# Patient Record
Sex: MALE | Race: WHITE
[De-identification: names, ages, dates, MRNs, and addresses within clinical notes are randomized per-mention and may not be internally consistent; named-entity substitution may affect disease eponyms.]

---

## 2019-12-18 ENCOUNTER — HOSPITAL ENCOUNTER (INPATIENT)
Dept: HOSPITAL 74 - YASAS | Age: 59
LOS: 4 days | Discharge: HOME | End: 2019-12-22
Attending: ALLERGY & IMMUNOLOGY | Admitting: ALLERGY & IMMUNOLOGY
Payer: COMMERCIAL

## 2019-12-18 VITALS — BODY MASS INDEX: 20.2 KG/M2

## 2019-12-18 DIAGNOSIS — F32.9: ICD-10-CM

## 2019-12-18 DIAGNOSIS — F12.20: ICD-10-CM

## 2019-12-18 DIAGNOSIS — Z86.11: ICD-10-CM

## 2019-12-18 DIAGNOSIS — F10.230: Primary | ICD-10-CM

## 2019-12-18 DIAGNOSIS — F17.213: ICD-10-CM

## 2019-12-18 DIAGNOSIS — J45.20: ICD-10-CM

## 2019-12-18 PROCEDURE — HZ2ZZZZ DETOXIFICATION SERVICES FOR SUBSTANCE ABUSE TREATMENT: ICD-10-PCS | Performed by: ALLERGY & IMMUNOLOGY

## 2019-12-18 RX ADMIN — Medication PRN MG: at 22:13

## 2019-12-18 RX ADMIN — METHOCARBAMOL PRN MG: 500 TABLET ORAL at 22:12

## 2019-12-18 RX ADMIN — ALBUTEROL SULFATE PRN PUFF: 90 AEROSOL, METERED RESPIRATORY (INHALATION) at 22:13

## 2019-12-18 RX ADMIN — Medication SCH MG: at 22:12

## 2019-12-18 NOTE — HP
CIWA Score


Nausea/Vomitin-No Nausea/No Vomiting


Muscle Tremors: None


Anxiety: 4-Mod. Anxious/Guarded


Agitation: 4-Moderately Restless


Paroxysmal Sweats: 3


Orientation: 1-Uncertain about Date


Tacttile Disturbances: 0-None


Auditory Disturbances: 0-None


Visual Disturbances: 0-None


Headache: 0-None Present


CIWA-Ar Total Score: 12





- Admission Criteria


[facility] Guidelines: Admission for Medically Managed Detox: 


Requires at least one of the followin. CIWA greater than 12


2. Seizures within the past 24 hours


3. Delirium tremens within the past 24 hours


4. Hallucinations within the past 24 hours


5. Acute intervention needed for co  occurring medical disorder


6. Acute intervention needed for co  occurring psychiatric disorder


7. Severe withdrawal that cannot be handled at a lower level of care (continued


    vomiting, continued diarrhea, abnormal vital signs) requiring intravenous


    medication and/or fluids


8. Pregnancy





Patient presents the following: CIWA greater than 12


Admission Criteria Met: Admission criteria met





Admission ROS [facility]





- [facility]


Chief Complaint: 





SEEKING ALCOHOL DETOX


Allergies/Adverse Reactions: 


 Allergies











Allergy/AdvReac Type Severity Reaction Status Date / Time


 


No Known Allergies Allergy   Verified 19 17:02











History of Present Illness: 





HERE FOR ALCOHOL DETOX. CLIENT IS REFERRED BY [facility] WHERE HE IS ON 

METHADONE 70 MG DAILY. CLIENT REPORTS LAST DOSE 1 DAY AGO PENDING VERIFICATION. 

REPORTS DAILY ALCOHOL INTAKE. LAST INTAKE THIS MORNING, + [employer] [employer]ENER, . DENIES 

IVDA, DRUG OVERDOSE , SEIZURES, BLACKOUTS. CLIENT CONT TO USE 4 BAGS OF HEROIN 

DAILY DESPITE BEING ON MMTP. THIS IS HIS FIRST ADMISSION HERE. LAST DETOX "YEARS

". LONGEST CLEAN TIME 7 YEARS RELAPSING OVER 20 YEARS AGO. DENIES ANY CLEAN 

TIME THIS PAST YEAR. LIVES W/ WIFE, UNEMPLOYED, DENIES LEGALS


Exam Limitations: No Limitations





- Ebola screening


Have you traveled outside of the country in the last 21 days: No (N)


Have you had contact with anyone from an Ebola affected area: No


Do you have a fever: No





- Review of Systems


Constitutional: Changes in sleep, Unintentional Wgt. Loss


EENT: reports: Blurred Vision (GLASSES), Dental Problems (MISSING TEETH), Other


Respiratory: reports: Shortness of Breath (HX/O ASTHMA)


Cardiac: reports: No Symptoms Reported


GI: reports: Poor Fluid Intake


: reports: No Symptoms Reported


Musculoskeletal: reports: No Symptoms Reported


Integumentary: reports: No Symptoms Reported


Neuro: reports: No Symptoms reported


Endocrine: reports: No Symptoms Reported


Hematology: reports: No Symptoms Reported


Psychiatric: reports: Orientated x3, Anxious


Other Systems: Reviewed and Negative





Patient History





- Patient Medical History


Hx Anemia: No


Hx Asthma: Yes


Hx Chronic Obstructive Pulmonary Disease (COPD): No


Hx Cancer: No


Hx Cardiac Disorders: No


Hx Congestive Heart Failure: No


Hx Hypertension: No


Hx Hypercholesterolemia: No


Hx Pacemaker: No


HX Cerebrovascular Accident: No


Hx Seizures: No


Hx Dementia: No


Hx Diabetes: No


Hx Gastrointestinal Disorders: No


Hx Liver Disease: No


Hx Genitourinary Disorders: No


Hx Sexually Transmitted Disorders: No


Hx Renal Disease (ESRD): No


Hx Thyroid Disease: No


Hx Human Immunodeficiency Virus (HIV): No


Hx Hepatitis C: No


Hx Depression: No


Hx Suicide Attempt: No


Hx Bipolar Disorder: No


Hx Schizophrenia: No


Other Medical History: DENIES





- Patient Surgical History


Past Surgical History: No





- PPD History


Previous Implant?: Yes


Documented Results: Positive w/o proof


Implanted On Prior [facility] Admission?: No


PPD to be Administered?: No





- Smoking Cessation


Smoking history: Current every day smoker


Have you smoked in the past 12 months: Yes


Aproximately how many cigarettes per day: 4


Cigars Per Day: 0


Hx Chewing Tobacco Use: No


Initiated information on smoking cessation: Yes


'Breaking Loose' booklet given: 19





- Substance & Tx. History


Hx Alcohol Use: Yes


Hx Substance Use: Yes


Substance Use Type: Alcohol, Heroin, Prescribed (MTD)


Hx Substance Use Treatment: Yes (DOES NOT RECALL)





- Substances abused


  ** Alcohol


Substance route: Oral


Frequency: Daily


Amount used: 15 CANS OF 24 OZ


Age of first use: 18


Date of last use: 19 (5 CANS)





  ** Heroin


Substance route: Inhalation


Frequency: Daily


Amount used: 4 bags


Age of first use: 19


Date of last use: 19





Admission Physical Exam [facility]





- Vital Signs


Vital Signs: 


 Vital Signs - 24 hr











  19





  17:01 17:34


 


Temperature 97.6 F 97.6 F


 


Pulse Rate 73 73


 


Respiratory 16 16





Rate  


 


Blood Pressure 130/85 130/85














- Physical


General Appearance: Yes: Mild Distress, Anxious


HEENTM: Yes: EOMI, Normocephalic, Normal Voice, SHERRY, Pharynx Normal, Other (

missing teeth)


Respiratory: Yes: Chest Non-Tender, Lungs Clear, Normal Breath Sounds, No 

Respiratory Distress, No Accessory Muscle Use


Neck: Yes: No masses,lesions,Nodules, Supple, Trachea in good position


Breast: Yes: Breast Exam Deferred


Cardiology: Yes: Regular Rhythm, Regular Rate, S1, S2


Abdominal: Yes: Non Tender, Soft, Increased Bowel Sounds


Genitourinary: Yes: Within Normal Limits


Back: Yes: Normal Inspection


Musculoskeletal: Yes: full range of Motion, Gait Steady


Extremities: Yes: Normal Capillary Refill, Normal Range of Motion, Non-Tender


Neurological: Yes: Fully Oriented, Alert, Motor Strength 5/5, Depressed Affect (

denies si)


Integumentary: Yes: Dry, Warm


Lymphatic: Yes: Within Normal Limits





- Diagnostic


(1) Alcohol dependence with withdrawal, uncomplicated


Current Visit: Yes   Status: Acute   





(2) Opiate abuse, episodic


Current Visit: Yes   Status: Acute   





(3) Methadone maintenance therapy patient


Current Visit: Yes   Status: Chronic   





(4) Asthma


Current Visit: Yes   Status: Chronic   


Qualifiers: 


   Asthma severity: mild   Asthma persistence: intermittent   Asthma 

complication type: unspecified   Qualified Code(s): J45.20 - Mild intermittent 

asthma, uncomplicated   





(5) Nicotine dependence


Current Visit: Yes   Status: Chronic   


Qualifiers: 


   Nicotine product type: cigarettes   Substance use status: uncomplicated   

Qualified Code(s): F17.210 - Nicotine dependence, cigarettes, uncomplicated   





(6) Depressed


Current Visit: Yes   Status: Acute   





(7) History of tuberculosis


Current Visit: Yes   Status: Resolved   





Cleared for Admission S





- Detox or Rehab


S Level of Care: Medically Managed (ativan)


Claeared for Rehab Admission: No





Breathalyzer





- Breathalyzer


Breathalyzer: 0.038





Urine Drug Screen





- Test Device


Lot number: [id number]


Expiration date: 21





- Control


Is test valid?: Yes





- Results


Drug screen NEGATIVE: No


Urine drug screen results: FEN-Fentanyl, MOP-Opiates, MTD-Methadone





Inpatient Rehab Admission





- Rehab Decision to Admit


Inpatient rehab admission?: No

## 2019-12-19 LAB
ALBUMIN SERPL-MCNC: 2.9 G/DL (ref 3.4–5)
ALP SERPL-CCNC: 85 U/L (ref 45–117)
ALT SERPL-CCNC: 22 U/L (ref 13–61)
ANION GAP SERPL CALC-SCNC: 4 MMOL/L (ref 8–16)
AST SERPL-CCNC: 19 U/L (ref 15–37)
BILIRUB SERPL-MCNC: 0.5 MG/DL (ref 0.2–1)
BUN SERPL-MCNC: 10.2 MG/DL (ref 7–18)
CALCIUM SERPL-MCNC: 8.3 MG/DL (ref 8.5–10.1)
CHLORIDE SERPL-SCNC: 107 MMOL/L (ref 98–107)
CO2 SERPL-SCNC: 29 MMOL/L (ref 21–32)
CREAT SERPL-MCNC: 0.7 MG/DL (ref 0.55–1.3)
DEPRECATED RDW RBC AUTO: 13.7 % (ref 11.9–15.9)
GLUCOSE SERPL-MCNC: 242 MG/DL (ref 74–106)
HCT VFR BLD CALC: 36 % (ref 35.4–49)
HGB BLD-MCNC: 11.9 GM/DL (ref 11.7–16.9)
MCH RBC QN AUTO: 28.2 PG (ref 25.7–33.7)
MCHC RBC AUTO-ENTMCNC: 33.1 G/DL (ref 32–35.9)
MCV RBC: 85.4 FL (ref 80–96)
PLATELET # BLD AUTO: 201 K/MM3 (ref 134–434)
PMV BLD: 9.2 FL (ref 7.5–11.1)
POTASSIUM SERPLBLD-SCNC: 4.2 MMOL/L (ref 3.5–5.1)
PROT SERPL-MCNC: 5.7 G/DL (ref 6.4–8.2)
RBC # BLD AUTO: 4.21 M/MM3 (ref 4–5.6)
SODIUM SERPL-SCNC: 141 MMOL/L (ref 136–145)
WBC # BLD AUTO: 7.2 K/MM3 (ref 4–10)

## 2019-12-19 RX ADMIN — Medication SCH MG: at 22:31

## 2019-12-19 RX ADMIN — Medication SCH TAB: at 10:15

## 2019-12-19 RX ADMIN — GUAIFENESIN PRN ML: 100 SOLUTION ORAL at 22:31

## 2019-12-19 RX ADMIN — Medication PRN MG: at 22:30

## 2019-12-19 RX ADMIN — NICOTINE SCH MG: 14 PATCH, EXTENDED RELEASE TRANSDERMAL at 10:18

## 2019-12-19 NOTE — EKG
Test Reason : 

Blood Pressure : ***/*** mmHG

Vent. Rate : 060 BPM     Atrial Rate : 060 BPM

   P-R Int : 138 ms          QRS Dur : 086 ms

    QT Int : 470 ms       P-R-T Axes : 076 076 061 degrees

   QTc Int : 470 ms

 

NORMAL SINUS RHYTHM

POSSIBLE LEFT ATRIAL ENLARGEMENT

BORDERLINE ECG

NO PREVIOUS ECGS AVAILABLE

Confirmed by JERICHO MAJOR, ESME ([id number]) on 12/19/2019 11:59:53 AM

 

Referred By:  [facility]           Confirmed By:ESME ECHAVARRIA MD

## 2019-12-19 NOTE — PN
[facility] CIWA





- CIWA Score


Nausea/Vomitin-Mild Nausea/No Vomiting


Muscle Tremors: 2


Anxiety: 2


Agitation: 2


Paroxysmal Sweats: 2


Orientation: 1-Uncertain about Date


Tacttile Disturbances: 0-None


Auditory Disturbances: 0-None


Visual Disturbances: 0-None


Headache: 1-Very Mild


CIWA-Ar Total Score: 11





[facility] Progress Note (SOAP)


Subjective: 





59 years old male admitted on 19 for alcohol withdrawal sx management


treating with ativan detox regimen


ate breakfast tolerated food and fluid well


methadone 70mg verified and order today


Objective: 





19 10:00


 Vital Signs











Temperature  97.8 F   19 09:09


 


Pulse Rate  68   19 09:09


 


Respiratory Rate  16   19 09:09


 


Blood Pressure  126/78   19 09:09


 


O2 Sat by Pulse Oximetry (%)      








 Laboratory Last Values











WBC  7.2 K/mm3 (4.0-10.0)   19  08:00    


 


RBC  4.21 M/mm3 (4.00-5.60)   19  08:00    


 


Hgb  11.9 GM/dL (11.7-16.9)   19  08:00    


 


Hct  36.0 % (35.4-49)   19  08:00    


 


MCV  85.4 fl (80-96)   19  08:00    


 


MCH  28.2 pg (25.7-33.7)   19  08:00    


 


MCHC  33.1 g/dl (32.0-35.9)   19  08:00    


 


RDW  13.7 % (11.9-15.9)   19  08:00    


 


Plt Count  201 K/MM3 (134-434)   19  08:00    


 


MPV  9.2 fl (7.5-11.1)   19  08:00    








lab noted


Assessment: 





19 10:00


alcohol withdrawal


Plan: 





ativan regimen

## 2019-12-20 LAB
APPEARANCE UR: (no result)
BILIRUB UR STRIP.AUTO-MCNC: NEGATIVE MG/DL
COLOR UR: (no result)
KETONES UR QL STRIP: (no result)
LEUKOCYTE ESTERASE UR QL STRIP.AUTO: NEGATIVE
NITRITE UR QL STRIP: NEGATIVE
PH UR: 5 [PH] (ref 5–8)
PROT UR QL STRIP: (no result)
PROT UR QL STRIP: NEGATIVE
SP GR UR: 1.03 (ref 1.01–1.03)
UROBILINOGEN UR STRIP-MCNC: 0.2 MG/DL (ref 0.2–1)

## 2019-12-20 RX ADMIN — Medication PRN MG: at 22:13

## 2019-12-20 RX ADMIN — NICOTINE SCH MG: 14 PATCH, EXTENDED RELEASE TRANSDERMAL at 10:26

## 2019-12-20 RX ADMIN — Medication SCH TAB: at 10:26

## 2019-12-20 RX ADMIN — Medication SCH MG: at 22:13

## 2019-12-20 RX ADMIN — METHADONE HYDROCHLORIDE SCH MG: 40 TABLET ORAL at 05:38

## 2019-12-20 RX ADMIN — ALBUTEROL SULFATE PRN PUFF: 90 AEROSOL, METERED RESPIRATORY (INHALATION) at 15:18

## 2019-12-20 NOTE — PN
[facility] CIWA





- CIWA Score


Nausea/Vomiting: 3


Muscle Tremors: 2


Anxiety: 1-Mildly Anxious


Agitation: 0-Normal Activity


Paroxysmal Sweats: 2


Orientation: 0-Oriented


Tacttile Disturbances: 1-Very Mild Itch/Numbness


Auditory Disturbances: 0-None


Visual Disturbances: 1-Very Mild Sensitivity


Headache: 0-None Present


CIWA-Ar Total Score: 10





[facility] Progress Note (SOAP)


Subjective: 





c/o increase flatulence, and diarrhea, chills, sweats, decrease appetite and 

nausea 


Objective: 





12/20/19 10:25


 Vital Signs











Temperature  98.4 F   12/20/19 09:48


 


Pulse Rate  69   12/20/19 09:48


 


Respiratory Rate  16   12/20/19 09:48


 


Blood Pressure  124/84   12/20/19 09:48


 


O2 Sat by Pulse Oximetry (%)      








 Laboratory Last Values











WBC  7.2 K/mm3 (4.0-10.0)   12/19/19  08:00    


 


RBC  4.21 M/mm3 (4.00-5.60)   12/19/19  08:00    


 


Hgb  11.9 GM/dL (11.7-16.9)   12/19/19  08:00    


 


Hct  36.0 % (35.4-49)   12/19/19  08:00    


 


MCV  85.4 fl (80-96)   12/19/19  08:00    


 


MCH  28.2 pg (25.7-33.7)   12/19/19  08:00    


 


MCHC  33.1 g/dl (32.0-35.9)   12/19/19  08:00    


 


RDW  13.7 % (11.9-15.9)   12/19/19  08:00    


 


Plt Count  201 K/MM3 (134-434)   12/19/19  08:00    


 


MPV  9.2 fl (7.5-11.1)   12/19/19  08:00    


 


Sodium  141 mmol/L (136-145)   12/19/19  08:00    


 


Potassium  4.2 mmol/L (3.5-5.1)   12/19/19  08:00    


 


Chloride  107 mmol/L ()   12/19/19  08:00    


 


Carbon Dioxide  29 mmol/L (21-32)   12/19/19  08:00    


 


Anion Gap  4 MMOL/L (8-16)  L  12/19/19  08:00    


 


BUN  10.2 mg/dL (7-18)   12/19/19  08:00    


 


Creatinine  0.7 mg/dL (0.55-1.3)   12/19/19  08:00    


 


Est GFR (CKD-EPI)AfAm  120.56   12/19/19  08:00    


 


Est GFR (CKD-EPI)NonAf  104.02   12/19/19  08:00    


 


Random Glucose  242 mg/dL ()  H  12/19/19  08:00    


 


Calcium  8.3 mg/dL (8.5-10.1)  L  12/19/19  08:00    


 


Total Bilirubin  0.5 mg/dL (0.2-1)   12/19/19  08:00    


 


AST  19 U/L (15-37)   12/19/19  08:00    


 


ALT  22 U/L (13-61)   12/19/19  08:00    


 


Alkaline Phosphatase  85 U/L ()   12/19/19  08:00    


 


Total Protein  5.7 g/dl (6.4-8.2)  L  12/19/19  08:00    


 


Albumin  2.9 g/dl (3.4-5.0)  L  12/19/19  08:00    


 


RPR Titer  Nonreactive  (NONREACTIVE)   12/19/19  08:00    











Assessment: 





12/20/19 12:23


Aox3 no acute distress 


poor dentition 


full ROM no gait disturbance 





withdrawal sx 


Plan: 





increase PO fluids 


continue detox 


continue to monitor

## 2019-12-21 RX ADMIN — METHOCARBAMOL PRN MG: 500 TABLET ORAL at 07:43

## 2019-12-21 RX ADMIN — ALBUTEROL SULFATE PRN PUFF: 90 AEROSOL, METERED RESPIRATORY (INHALATION) at 09:16

## 2019-12-21 RX ADMIN — Medication SCH MG: at 22:12

## 2019-12-21 RX ADMIN — GUAIFENESIN PRN ML: 100 SOLUTION ORAL at 18:33

## 2019-12-21 RX ADMIN — METHADONE HYDROCHLORIDE SCH MG: 40 TABLET ORAL at 05:20

## 2019-12-21 RX ADMIN — Medication SCH TAB: at 10:18

## 2019-12-21 RX ADMIN — Medication PRN MG: at 22:12

## 2019-12-21 RX ADMIN — GUAIFENESIN PRN ML: 100 SOLUTION ORAL at 07:43

## 2019-12-21 RX ADMIN — NICOTINE SCH MG: 14 PATCH, EXTENDED RELEASE TRANSDERMAL at 10:18

## 2019-12-21 NOTE — PN
[facility] CIWA





- CIWA Score


Nausea/Vomitin-No Nausea/No Vomiting


Muscle Tremors: None


Anxiety: 2


Agitation: 0-Normal Activity


Paroxysmal Sweats: 2


Orientation: 0-Oriented


Tacttile Disturbances: 0-None


Auditory Disturbances: 0-None


Visual Disturbances: 0-None


Headache: 0-None Present


CIWA-Ar Total Score: 4





[facility] Progress Note (SOAP)


Subjective: 





c/o mild withdrawal symptoms.


Objective: 





19 10:39


 Vital Signs











  19





  03:30 06:09 09:18


 


Temperature  98.9 F 97.1 F L


 


Pulse Rate  57 L 66


 


Respiratory 16 16 16





Rate   


 


Blood Pressure  130/74 99/67








 Lab Results











WBC  7.2 K/mm3 (4.0-10.0)   19  08:00    


 


RBC  4.21 M/mm3 (4.00-5.60)   19  08:00    


 


Hgb  11.9 GM/dL (11.7-16.9)   19  08:00    


 


Hct  36.0 % (35.4-49)   19  08:00    


 


MCV  85.4 fl (80-96)   19  08:00    


 


MCHC  33.1 g/dl (32.0-35.9)   19  08:00    


 


RDW  13.7 % (11.9-15.9)   19  08:00    


 


Plt Count  201 K/MM3 (134-434)   19  08:00    


 


Sodium  141 mmol/L (136-145)   19  08:00    


 


Potassium  4.2 mmol/L (3.5-5.1)   19  08:00    


 


Chloride  107 mmol/L ()   19  08:00    


 


Carbon Dioxide  29 mmol/L (21-32)   19  08:00    


 


Anion Gap  4 MMOL/L (8-16)  L  19  08:00    


 


BUN  10.2 mg/dL (7-18)   19  08:00    


 


Creatinine  0.7 mg/dL (0.55-1.3)   19  08:00    


 


Random Glucose  242 mg/dL ()  H  19  08:00    


 


Calcium  8.3 mg/dL (8.5-10.1)  L  19  08:00    








Labs noted.


Assessment: 





19 10:40


AOX3, in no respiratory distress.


Full ROM, ambulating in the unit.


Mild Withdrawal symptoms.


For d/c tomorrow.


19 10:40





Plan: 





continue detox.


D/C in AM.

## 2019-12-22 VITALS — HEART RATE: 65 BPM | DIASTOLIC BLOOD PRESSURE: 73 MMHG | TEMPERATURE: 96.7 F | SYSTOLIC BLOOD PRESSURE: 113 MMHG

## 2019-12-22 RX ADMIN — METHADONE HYDROCHLORIDE SCH MG: 40 TABLET ORAL at 05:34

## 2019-12-22 RX ADMIN — Medication SCH TAB: at 10:07

## 2019-12-22 RX ADMIN — NICOTINE SCH MG: 14 PATCH, EXTENDED RELEASE TRANSDERMAL at 10:07

## 2019-12-22 RX ADMIN — GUAIFENESIN PRN ML: 100 SOLUTION ORAL at 07:29

## 2019-12-22 NOTE — DS
[facility] Detox Discharge Summary


Admission Date: 


12/18/19





Discharge Date: 12/22/19





- History


Present History: Alcohol Dependence


Additional Comments: 





59 years old male admitted on 12/18/19 for alcohol withdrawal sx management 

treaeted with ativan detox regimen


patient tolerated well alert oriented x 3 


cardiac s1s2 regular rate rhythm


ekg indicate possible left atrial enlargement


respiratory clear lung bilaterally on auscultation


skin warm and dry


Pertinent Past History: 





patient reports that he does not want to return to shelter 


states that he vomiting blood diarrhea feeling dizziness and trouble breathing


encourage the patient resting on bed and possible ER evaluation


patient refuses to go to the ER for further evaluation


patient refuses to take one dose of imodium


refuses ventolin pump


patient refuses to lying on the bed patient insists to stay in day room social 

with peers 











patient states that he is going back to his shelter and continues methadone 

maintenance program 


patient agrees to bringing in lab report and medication list to methadone 

program for follow up


patient also is interesting in [facility] that the patient prefers to go to 

[facility] first and possible go to the ER from [facility]








- Physical Exam Results


Vital Signs: 


 Vital Signs











Temperature  96.7 F L  12/22/19 09:09


 


Pulse Rate  65   12/22/19 09:09


 


Respiratory Rate  16   12/22/19 09:09


 


Blood Pressure  113/73   12/22/19 09:09


 


O2 Sat by Pulse Oximetry (%)      











Pertinent Admission Physical Exam Findings: 





alcohol withdrawal 


 Laboratory Last Values











WBC  7.2 K/mm3 (4.0-10.0)   12/19/19  08:00    


 


RBC  4.21 M/mm3 (4.00-5.60)   12/19/19  08:00    


 


Hgb  11.9 GM/dL (11.7-16.9)   12/19/19  08:00    


 


Hct  36.0 % (35.4-49)   12/19/19  08:00    


 


MCV  85.4 fl (80-96)   12/19/19  08:00    


 


MCH  28.2 pg (25.7-33.7)   12/19/19  08:00    


 


MCHC  33.1 g/dl (32.0-35.9)   12/19/19  08:00    


 


RDW  13.7 % (11.9-15.9)   12/19/19  08:00    


 


Plt Count  201 K/MM3 (134-434)   12/19/19  08:00    


 


MPV  9.2 fl (7.5-11.1)   12/19/19  08:00    


 


Sodium  141 mmol/L (136-145)   12/19/19  08:00    


 


Potassium  4.2 mmol/L (3.5-5.1)   12/19/19  08:00    


 


Chloride  107 mmol/L ()   12/19/19  08:00    


 


Carbon Dioxide  29 mmol/L (21-32)   12/19/19  08:00    


 


Anion Gap  4 MMOL/L (8-16)  L  12/19/19  08:00    


 


BUN  10.2 mg/dL (7-18)   12/19/19  08:00    


 


Creatinine  0.7 mg/dL (0.55-1.3)   12/19/19  08:00    


 


Est GFR (CKD-EPI)AfAm  120.56   12/19/19  08:00    


 


Est GFR (CKD-EPI)NonAf  104.02   12/19/19  08:00    


 


Random Glucose  242 mg/dL ()  H  12/19/19  08:00    


 


Calcium  8.3 mg/dL (8.5-10.1)  L  12/19/19  08:00    


 


Total Bilirubin  0.5 mg/dL (0.2-1)   12/19/19  08:00    


 


AST  19 U/L (15-37)   12/19/19  08:00    


 


ALT  22 U/L (13-61)   12/19/19  08:00    


 


Alkaline Phosphatase  85 U/L ()   12/19/19  08:00    


 


Total Protein  5.7 g/dl (6.4-8.2)  L  12/19/19  08:00    


 


Albumin  2.9 g/dl (3.4-5.0)  L  12/19/19  08:00    


 


Urine Color  Dk yellow   12/20/19  13:45    


 


Urine Appearance  Turbid   12/20/19  13:45    


 


Urine pH  5.0  (5.0-8.0)   12/20/19  13:45    


 


Ur Specific Gravity  1.028  (1.010-1.035)   12/20/19  13:45    


 


Urine Protein  Trace  (NEGATIVE)   12/20/19  13:45    


 


Urine Glucose (UA)  Negative  (NEGATIVE)   12/20/19  13:45    


 


Urine Ketones  Trace  (NEGATIVE)  H  12/20/19  13:45    


 


Urine Blood  Negative  (NEGATIVE)   12/20/19  13:45    


 


Urine Nitrite  Negative  (NEGATIVE)   12/20/19  13:45    


 


Urine Bilirubin  Negative  (NEGATIVE)   12/20/19  13:45    


 


Urine Urobilinogen  0.2 mg/dL (0.2-1.0)   12/20/19  13:45    


 


Ur Leukocyte Esterase  Negative  (NEGATIVE)   12/20/19  13:45    


 


RPR Titer  Nonreactive  (NONREACTIVE)   12/19/19  08:00    








lab noted


 Laboratory Last Values





 Vital Signs











Temperature  96.7 F L  12/22/19 09:09


 


Pulse Rate  65   12/22/19 09:09


 


Respiratory Rate  16   12/22/19 09:09


 


Blood Pressure  113/73   12/22/19 09:09


 


O2 Sat by Pulse Oximetry (%)      

















- Treatment


Hospital Course: Detox Protocol Followed, Detoxed Safely, Responded well, 

Discharged Condition Good, Rehab Referral Accepted


Patient has Accepted a Rehab Referral to: [facility]





- Medication


Discharge Medications: 


Ambulatory Orders





Albuterol Sulfate Inhaler - [Ventolin HFA Inhaler -] 2 inhaler PO Q4HWA PRN 12/ 18/19 


Zolpidem Tartrate [Ambien] 10 mg PO HS 12/18/19 











- Diagnosis


(1) Alcohol dependence with withdrawal, uncomplicated


Status: Acute   





(2) Asthma


Status: Chronic   


Qualifiers: 


   Asthma severity: mild   Asthma persistence: intermittent   Asthma 

complication type: unspecified   Qualified Code(s): J45.20 - Mild intermittent 

asthma, uncomplicated   





(3) Methadone maintenance therapy patient


Status: Chronic   





(4) Nicotine dependence


Status: Acute   


Qualifiers: 


   Nicotine product type: cigarettes   Substance use status: in withdrawal   

Qualified Code(s): F17.213 - Nicotine dependence, cigarettes, with withdrawal   





- AMA


Did Patient Leave Against Medical Advice: No